# Patient Record
Sex: MALE | Race: OTHER | URBAN - METROPOLITAN AREA
[De-identification: names, ages, dates, MRNs, and addresses within clinical notes are randomized per-mention and may not be internally consistent; named-entity substitution may affect disease eponyms.]

---

## 2021-02-15 NOTE — PATIENT DISCUSSION
PT HAD CONSULT W/ DR. RODRÍGUEZ 15 YEARS AGO AND WAS TURNED AWAY DAY OF SURGERY 2' CORNEAL IRREGULARITY OS (? - PER PT) AND WAS TOLD HE'D BE A BETTER CANDIDATE FOR INTACTS. THEREFORE, [address] AND TOPOS TODAY; ADVISED WILL REVIEW CASE W/ DR. RODRÍGUEZ AND CALL HIM TO LET HIM KNOW HIS CANDIDACY.

## 2021-03-01 NOTE — PATIENT DISCUSSION
HYPOTHYROIDISM - PT STOPPED TX 4+ YEARS AGO 2' SYMPTOMS IMPROVED S/P STABILIZING HIS WEIGHT. ADVISED WE WILL STILL TALK TO PCP TO DISCUSS IF CLEARANCE NEEDED. DISCUSSED INCREASED RISK OF PROLONGED HEALING AND UNPREDICTABLE VISUAL OUTCOME. MEDICAL CLEARANCE CONFIRMING STABILITY PRIOR TO TREATMENT IS REQUIRED.

## 2021-03-01 NOTE — PATIENT DISCUSSION
New Prescription: prednisolone acetate (prednisolone acetate): drops,suspension: 1% 1 drop four times a day into both eyes 03-

## 2021-03-01 NOTE — PATIENT DISCUSSION
New Prescription: gatifloxacin (gatifloxacin): drops: 0.5% 1 drop four times a day into both eyes 03-

## 2021-03-04 NOTE — PATIENT DISCUSSION
Continue: prednisolone acetate (prednisolone acetate): drops,suspension: 1% 1 drop four times a day into both eyes 03-

## 2021-06-08 NOTE — PATIENT DISCUSSION
Stopped Today: prednisolone acetate (prednisolone acetate): drops,suspension: 1% 1 drop four times a day into both eyes 03-

## 2021-06-08 NOTE — PATIENT DISCUSSION
3 MOS S/P LASIK OU - DOING WELL HAPPY W/ VISION. STATES HE IS CURRENTLY ASYMPTOMATIC W/ BLURRED READING RANGE. POSSIBLE LATENCY. SINCE PT IS CURRENTLY ASYMPTOMATIC AT THIS TIME, RECOMMEND TO CONTINUE TO MONITOR W/ DR. KEN FOR SCHEDULED FUS. IF SYMPTOMS ARRISE RTC FOR ENH OPTIONS.

## 2022-10-05 ENCOUNTER — PREPPED CHART (OUTPATIENT)
Dept: URBAN - METROPOLITAN AREA CLINIC 50 | Facility: CLINIC | Age: 72
End: 2022-10-05

## 2022-10-25 ENCOUNTER — APPOINTMENT (RX ONLY)
Dept: URBAN - METROPOLITAN AREA CLINIC 86 | Facility: CLINIC | Age: 72
Setting detail: DERMATOLOGY
End: 2022-10-25

## 2022-10-25 DIAGNOSIS — L30.9 DERMATITIS, UNSPECIFIED: ICD-10-CM

## 2022-10-25 PROCEDURE — ? FULL BODY SKIN EXAM - DECLINED

## 2022-10-25 PROCEDURE — ? PRESCRIPTION

## 2022-10-25 PROCEDURE — 99202 OFFICE O/P NEW SF 15 MIN: CPT

## 2022-10-25 PROCEDURE — ? ADDITIONAL NOTES

## 2022-10-25 PROCEDURE — ? COUNSELING

## 2022-10-25 RX ORDER — DESONIDE 0.5 MG/G
CREAM TOPICAL BID
Qty: 60 | Refills: 0 | Status: ERX | COMMUNITY
Start: 2022-10-25

## 2022-10-25 RX ADMIN — DESONIDE: 0.5 CREAM TOPICAL at 00:00

## 2022-10-25 ASSESSMENT — LOCATION DETAILED DESCRIPTION DERM
LOCATION DETAILED: RIGHT SUPERIOR FOREHEAD
LOCATION DETAILED: LEFT SUPERIOR FOREHEAD

## 2022-10-25 ASSESSMENT — LOCATION SIMPLE DESCRIPTION DERM
LOCATION SIMPLE: RIGHT FOREHEAD
LOCATION SIMPLE: LEFT FOREHEAD

## 2022-10-25 ASSESSMENT — LOCATION ZONE DERM: LOCATION ZONE: FACE

## 2022-10-25 NOTE — PROCEDURE: COUNSELING
Detail Level: Zone
Patient Specific Counseling (Will Not Stick From Patient To Patient): Skin care: Recommend minimizing sun exposure, wearing sunscreen and protective clothing.\\nExpectations: Post Inflammatory pigmentary change is lighter or darker discoloration than\\nsurrounding skin resulting from trauma or rashes. Areas tend to normalize over time, but can take months to years.\\nContact office if: PIH worsens, or spread to other parts of the body.

## 2022-10-25 NOTE — PROCEDURE: ADDITIONAL NOTES
Additional Notes: Patient consent was obtained to proceed with the visit and recommended plan of care after discussion of all risks and benefits, including the risks of COVID-19 exposure.
Detail Level: Simple
Render Risk Assessment In Note?: no
Additional Notes: Patient states he had previous sunburn a month ago and his PCP treated the sunburn with topical Efudex BID x 2 weeks. When his discontinued he was left with pigmentation.

## 2022-10-26 ENCOUNTER — NEW PATIENT (OUTPATIENT)
Dept: URBAN - METROPOLITAN AREA CLINIC 50 | Facility: CLINIC | Age: 72
End: 2022-10-26

## 2022-10-26 DIAGNOSIS — E11.9: ICD-10-CM

## 2022-10-26 DIAGNOSIS — H25.813: ICD-10-CM

## 2022-10-26 PROCEDURE — 92004 COMPRE OPH EXAM NEW PT 1/>: CPT

## 2022-10-26 ASSESSMENT — VISUAL ACUITY
OD_SC: CF 5FT
OS_CC: 20/25
OD_CC: 20/50
OU_SC: 20/400
OD_GLARE: 20/40
OD_GLARE: 20/25
OS_SC: 20/400
OU_CC: J1
OU_SC: J1+
OS_GLARE: 20/40
OS_GLARE: 20/25
OU_CC: 20/25

## 2022-10-26 ASSESSMENT — KERATOMETRY
OD_K1POWER_DIOPTERS: 44.75
OD_AXISANGLE_DEGREES: 133
OD_K2POWER_DIOPTERS: 45.25
OS_AXISANGLE2_DEGREES: 100
OS_K2POWER_DIOPTERS: 45.00
OS_AXISANGLE_DEGREES: 10
OS_K1POWER_DIOPTERS: 44.25
OD_AXISANGLE2_DEGREES: 43

## 2022-10-26 ASSESSMENT — TONOMETRY
OD_IOP_MMHG: 16
OS_IOP_MMHG: 16

## 2022-10-27 ENCOUNTER — RX ONLY (OUTPATIENT)
Age: 72
Setting detail: RX ONLY
End: 2022-10-27

## 2022-10-27 RX ORDER — HYDROCORTISONE 25 MG/G
OINTMENT TOPICAL
Qty: 28.35 | Refills: 1 | Status: ERX | COMMUNITY
Start: 2022-10-27

## 2022-11-30 ENCOUNTER — APPOINTMENT (RX ONLY)
Dept: URBAN - METROPOLITAN AREA CLINIC 86 | Facility: CLINIC | Age: 72
Setting detail: DERMATOLOGY
End: 2022-11-30

## 2022-11-30 DIAGNOSIS — L30.9 DERMATITIS, UNSPECIFIED: ICD-10-CM | Status: IMPROVED

## 2022-11-30 PROCEDURE — ? FULL BODY SKIN EXAM - DECLINED

## 2022-11-30 PROCEDURE — ? TREATMENT REGIMEN

## 2022-11-30 PROCEDURE — ? COUNSELING

## 2022-11-30 PROCEDURE — ? RECOMMENDATIONS

## 2022-11-30 PROCEDURE — ? PHOTO-DOCUMENTATION

## 2022-11-30 PROCEDURE — 99213 OFFICE O/P EST LOW 20 MIN: CPT

## 2022-11-30 PROCEDURE — ? DEFER

## 2022-11-30 PROCEDURE — ? ADDITIONAL NOTES

## 2022-11-30 ASSESSMENT — LOCATION DETAILED DESCRIPTION DERM
LOCATION DETAILED: RIGHT SUPERIOR FOREHEAD
LOCATION DETAILED: LEFT SUPERIOR FOREHEAD

## 2022-11-30 ASSESSMENT — LOCATION SIMPLE DESCRIPTION DERM
LOCATION SIMPLE: LEFT FOREHEAD
LOCATION SIMPLE: RIGHT FOREHEAD

## 2022-11-30 ASSESSMENT — LOCATION ZONE DERM: LOCATION ZONE: FACE

## 2022-11-30 NOTE — PROCEDURE: RECOMMENDATIONS
Detail Level: Zone
Render Risk Assessment In Note?: no
Recommendation Preamble: The following recommendations were made during the visit: Hydroquinone cream vs laser

## 2022-11-30 NOTE — PROCEDURE: PHOTO-DOCUMENTATION
Photo Preface (Leave Blank If You Do Not Want): Photographs were obtained today
Detail Level: Detailed
Details (Free Text): See Chart

## 2022-11-30 NOTE — PROCEDURE: COUNSELING
Detail Level: Zone
Patient Specific Counseling (Will Not Stick From Patient To Patient): Skin care: Recommend minimizing sun exposure, wearing sunscreen and protective clothing.\\nExpectations: Post Inflammatory pigmentary change is lighter or darker discoloration than\\nsurrounding skin resulting from trauma or rashes. Areas tend to normalize over time, but can take months to years.\\nContact office if: PIH worsens, or spread to other parts of the body.\\n\\nHydroquinone Counseling:  Patient advised that medication may result in skin irritation, lightening (hypopigmentation), dryness, and burning.  In the event of skin irritation, the patient was advised to reduce the amount of the drug applied or use it less frequently.  Rarely, spots that are treated with hydroquinone can become darker (pseudoochronosis).  Should this occur, patient instructed to stop medication and call the office. The patient verbalized understanding of the proper use and possible adverse effects of hydroquinone.  All of the patient's questions and concerns were addressed.

## 2023-04-26 ENCOUNTER — APPOINTMENT (RX ONLY)
Dept: URBAN - METROPOLITAN AREA CLINIC 86 | Facility: CLINIC | Age: 73
Setting detail: DERMATOLOGY
End: 2023-04-26

## 2023-04-26 DIAGNOSIS — L30.9 DERMATITIS, UNSPECIFIED: ICD-10-CM

## 2023-04-26 PROCEDURE — ? ADDITIONAL NOTES

## 2023-04-26 NOTE — PROCEDURE: ADDITIONAL NOTES
Detail Level: Zone
Additional Notes: Patient came into office requesting refill of hydrocortisone cream and he was not paying an office visit. He has been applying to hyperpigmented patches on forehead. I informed patient that the HC cream is not working since rash is still present. I told him I could try a new prescription but that would require prescription to be tied to a billable note. He refused to pay his Office visit copay and therefore declined the prescription. Discussed biopsy of rash to confirm dx but he refused as well.\\n\\nTodays visit is no charge. No prescription will be sent in. Patient reassured that he can return anytime in the future for further evaluation and treatment
Render Risk Assessment In Note?: no

## 2023-11-16 ENCOUNTER — COMPREHENSIVE EXAM (OUTPATIENT)
Dept: URBAN - METROPOLITAN AREA CLINIC 50 | Facility: LOCATION | Age: 73
End: 2023-11-16

## 2023-11-16 DIAGNOSIS — H25.813: ICD-10-CM

## 2023-11-16 DIAGNOSIS — H52.4: ICD-10-CM

## 2023-11-16 DIAGNOSIS — Z01.01: ICD-10-CM

## 2023-11-16 DIAGNOSIS — E11.9: ICD-10-CM

## 2023-11-16 PROCEDURE — 92015 DETERMINE REFRACTIVE STATE: CPT

## 2023-11-16 PROCEDURE — 92014 COMPRE OPH EXAM EST PT 1/>: CPT

## 2023-11-16 PROCEDURE — 92310-1E ESTABLISHED CL PATIENT SPHERICAL SINGLE VISION SOFT LENS EVALUATION

## 2023-11-16 ASSESSMENT — KERATOMETRY
OD_K2POWER_DIOPTERS: 45.25
OS_K1POWER_DIOPTERS: 44.25
OD_AXISANGLE2_DEGREES: 60
OS_AXISANGLE2_DEGREES: 91
OS_AXISANGLE_DEGREES: 001
OS_K2POWER_DIOPTERS: 45.25
OD_AXISANGLE_DEGREES: 150
OD_K1POWER_DIOPTERS: 45.00

## 2023-11-16 ASSESSMENT — VISUAL ACUITY
OS_CC: 20/25
OS_GLARE: 20/25
OU_CC: 20/25
OS_GLARE: 20/25
OD_PH: 20/25
OD_CC: 20/40
OD_GLARE: 20/25
OU_CC: J1
OD_GLARE: 20/25

## 2023-11-16 ASSESSMENT — TONOMETRY
OS_IOP_MMHG: 15
OD_IOP_MMHG: 14

## 2025-03-24 NOTE — PATIENT DISCUSSION
Stopped Today: gatifloxacin (gatifloxacin): drops: 0.5% 1 drop four times a day into both eyes 03- done